# Patient Record
Sex: FEMALE | Race: WHITE | Employment: OTHER | URBAN - METROPOLITAN AREA
[De-identification: names, ages, dates, MRNs, and addresses within clinical notes are randomized per-mention and may not be internally consistent; named-entity substitution may affect disease eponyms.]

---

## 2018-10-09 NOTE — PAT NURSING NOTE
[address] [facility] group office and spoke to Triage nurse to inquire regarding additional contact numbers,. Explained that contact numbers for patient were either non working, mailbox full and Emergency contact shaila Lua number a message was left to

## 2018-10-10 ENCOUNTER — HOSPITAL ENCOUNTER (OUTPATIENT)
Facility: HOSPITAL | Age: 63
Setting detail: HOSPITAL OUTPATIENT SURGERY
Discharge: HOME | End: 2018-10-10
Attending: INTERNAL MEDICINE | Admitting: INTERNAL MEDICINE
Payer: MEDICAID

## 2018-10-10 VITALS
TEMPERATURE: 98 F | HEART RATE: 70 BPM | HEIGHT: 64 IN | SYSTOLIC BLOOD PRESSURE: 98 MMHG | RESPIRATION RATE: 20 BRPM | WEIGHT: 216 LBS | BODY MASS INDEX: 36.88 KG/M2 | DIASTOLIC BLOOD PRESSURE: 47 MMHG | OXYGEN SATURATION: 96 %

## 2018-10-10 DIAGNOSIS — D50.0 IRON DEFICIENCY ANEMIA DUE TO CHRONIC BLOOD LOSS: ICD-10-CM

## 2018-10-10 PROCEDURE — 88305 TISSUE EXAM BY PATHOLOGIST: CPT | Performed by: INTERNAL MEDICINE

## 2018-10-10 PROCEDURE — 0DB98ZX EXCISION OF DUODENUM, VIA NATURAL OR ARTIFICIAL OPENING ENDOSCOPIC, DIAGNOSTIC: ICD-10-PCS | Performed by: INTERNAL MEDICINE

## 2018-10-10 PROCEDURE — 99152 MOD SED SAME PHYS/QHP 5/>YRS: CPT | Performed by: INTERNAL MEDICINE

## 2018-10-10 RX ORDER — MIDAZOLAM HYDROCHLORIDE 1 MG/ML
INJECTION INTRAMUSCULAR; INTRAVENOUS
Status: DISCONTINUED | OUTPATIENT
Start: 2018-10-10 | End: 2018-10-10

## 2018-10-10 RX ORDER — SODIUM CHLORIDE, SODIUM LACTATE, POTASSIUM CHLORIDE, CALCIUM CHLORIDE 600; 310; 30; 20 MG/100ML; MG/100ML; MG/100ML; MG/100ML
INJECTION, SOLUTION INTRAVENOUS CONTINUOUS
Status: DISCONTINUED | OUTPATIENT
Start: 2018-10-10 | End: 2018-10-10

## 2018-10-10 NOTE — OPERATIVE REPORT
[address] [facility] OPERATIVE REPORT   PATIENT NAME: Nav Venegas  MRN: [id number]  DATE OF OPERATION: 10/10/2018  PREOPERATIVE DIAGNOSIS:   1.     Fe defic anemia  POSTOPERATIVE DIAGNOSES:    Normal duodenum  s/p biopsy f Maira Clement MD

## 2018-10-10 NOTE — DISCHARGE SUMMARY
Outpatient Surgery Brief Discharge Summary         Patient ID:  Irene Sol  [id number]  57year old  11/18/1955    Discharge Diagnoses: Normal gastro    Procedures:EGD  Discharged Condition: stable    Disposition: home    Patient Instructio

## 2018-10-10 NOTE — H&P
HPI:  Kristin Gtz is a 57year old female. 11/18/1955. No chief complaint on file.  Milagro Mikerichie is here today for a follow up visit to discuss anemia. Sent for hgb 9. No results sent or w her nor avail.      She has had anemia prior. BMI 44.92 kg/m²    GENERAL: well developed, well nourished, in no apparent distress  SKIN: no rashes, no suspicious lesions  HEENT: atraumatic, normocephalic, ears and throat are clear  EYES: PERRLA, EOMI, normal optic disk,conjunctiva are clear  NECK: sup Full discussion of risks and benefits including bleeding,   infection, perforation, missed lesions, need for surgery made. The   patient wished to proceed. MEDICATIONS GIVEN: Versed 7 mg IV, fentanyl 100 mcg IV.    DESCRIPTION OF PROCEDURE: After normal c

## 2018-10-15 NOTE — PROGRESS NOTES
10/15/2018  [id number]    Dear Wong Kramer,       Here are the biopsy/pathology findings from your recent EGD (Upper  Endoscopy) :     possible celiac disease.       Follow Up:    Please have blood test performed

## (undated) DEVICE — FORCEP BIOPSY RJ4 LG CAP W/ND

## (undated) DEVICE — ENDOSCOPY PACK UPPER: Brand: MEDLINE INDUSTRIES, INC.

## (undated) DEVICE — Device: Brand: DEFENDO AIR/WATER/SUCTION AND BIOPSY VALVE

## (undated) DEVICE — 3M™ RED DOT™ MONITORING ELECTRODE WITH FOAM TAPE AND STICKY GEL, 50/BAG, 20/CASE, 72/PLT 2570: Brand: RED DOT™

## (undated) DEVICE — 1200CC GUARDIAN II: Brand: GUARDIAN

## (undated) DEVICE — FILTERLINE NASAL ADULT O2/CO2

## (undated) NOTE — LETTER
10/15/2018          [phone]    Dear Cat Yung,       Here are the biopsy/pathology findings from your recent EGD (Upper  Endoscopy) :     possible celiac disease.       Follow Up:    Please have blood te